# Patient Record
Sex: FEMALE | Race: BLACK OR AFRICAN AMERICAN | Employment: FULL TIME | URBAN - METROPOLITAN AREA
[De-identification: names, ages, dates, MRNs, and addresses within clinical notes are randomized per-mention and may not be internally consistent; named-entity substitution may affect disease eponyms.]

---

## 2024-02-20 SDOH — HEALTH STABILITY: PHYSICAL HEALTH: ON AVERAGE, HOW MANY DAYS PER WEEK DO YOU ENGAGE IN MODERATE TO STRENUOUS EXERCISE (LIKE A BRISK WALK)?: 1 DAY

## 2024-02-21 ENCOUNTER — TELEPHONE (OUTPATIENT)
Dept: PRIMARY CARE CLINIC | Age: 29
End: 2024-02-21

## 2024-02-21 ENCOUNTER — OFFICE VISIT (OUTPATIENT)
Dept: PRIMARY CARE CLINIC | Age: 29
End: 2024-02-21
Payer: COMMERCIAL

## 2024-02-21 VITALS
WEIGHT: 170.2 LBS | SYSTOLIC BLOOD PRESSURE: 108 MMHG | BODY MASS INDEX: 25.79 KG/M2 | DIASTOLIC BLOOD PRESSURE: 68 MMHG | RESPIRATION RATE: 16 BRPM | OXYGEN SATURATION: 99 % | HEIGHT: 68 IN | HEART RATE: 75 BPM | TEMPERATURE: 97.9 F

## 2024-02-21 DIAGNOSIS — Z11.59 ENCOUNTER FOR HEPATITIS C SCREENING TEST FOR LOW RISK PATIENT: ICD-10-CM

## 2024-02-21 DIAGNOSIS — Z11.4 ENCOUNTER FOR SCREENING FOR HIV: ICD-10-CM

## 2024-02-21 DIAGNOSIS — R25.1 TREMOR OF BOTH HANDS: ICD-10-CM

## 2024-02-21 DIAGNOSIS — Z13.1 DIABETES MELLITUS SCREENING: ICD-10-CM

## 2024-02-21 DIAGNOSIS — Z00.00 HEALTHCARE MAINTENANCE: Primary | ICD-10-CM

## 2024-02-21 PROCEDURE — G8484 FLU IMMUNIZE NO ADMIN: HCPCS | Performed by: FAMILY MEDICINE

## 2024-02-21 PROCEDURE — 99385 PREV VISIT NEW AGE 18-39: CPT | Performed by: FAMILY MEDICINE

## 2024-02-21 SDOH — ECONOMIC STABILITY: HOUSING INSECURITY
IN THE LAST 12 MONTHS, WAS THERE A TIME WHEN YOU DID NOT HAVE A STEADY PLACE TO SLEEP OR SLEPT IN A SHELTER (INCLUDING NOW)?: NO

## 2024-02-21 SDOH — ECONOMIC STABILITY: FOOD INSECURITY: WITHIN THE PAST 12 MONTHS, THE FOOD YOU BOUGHT JUST DIDN'T LAST AND YOU DIDN'T HAVE MONEY TO GET MORE.: NEVER TRUE

## 2024-02-21 SDOH — ECONOMIC STABILITY: FOOD INSECURITY: WITHIN THE PAST 12 MONTHS, YOU WORRIED THAT YOUR FOOD WOULD RUN OUT BEFORE YOU GOT MONEY TO BUY MORE.: NEVER TRUE

## 2024-02-21 SDOH — ECONOMIC STABILITY: INCOME INSECURITY: HOW HARD IS IT FOR YOU TO PAY FOR THE VERY BASICS LIKE FOOD, HOUSING, MEDICAL CARE, AND HEATING?: NOT HARD AT ALL

## 2024-02-21 ASSESSMENT — PATIENT HEALTH QUESTIONNAIRE - PHQ9
2. FEELING DOWN, DEPRESSED OR HOPELESS: 0
1. LITTLE INTEREST OR PLEASURE IN DOING THINGS: 0
SUM OF ALL RESPONSES TO PHQ QUESTIONS 1-9: 0
SUM OF ALL RESPONSES TO PHQ9 QUESTIONS 1 & 2: 0

## 2024-02-21 ASSESSMENT — ENCOUNTER SYMPTOMS
SHORTNESS OF BREATH: 0
COUGH: 0
COLOR CHANGE: 0
ABDOMINAL PAIN: 0
VOMITING: 0
RHINORRHEA: 0
NAUSEA: 0
BLOOD IN STOOL: 0
DIARRHEA: 0

## 2024-02-21 NOTE — ASSESSMENT & PLAN NOTE
Well controlled and intermittent. Unclear etiology. Will check CBC and TSH for this. Normal neuro exam in office. Brother with similar sx, likely benign and possibly familial. Call back/ED precautions discussed.

## 2024-02-21 NOTE — ASSESSMENT & PLAN NOTE
Overall doing well. Age appropriate screenings provided as per orders below. Reminded to schedule eye exam when able. Other problems addressed today as otherwise noted.

## 2024-02-21 NOTE — PROGRESS NOTES
Robyn Covarrubias is a 28 y.o. year old female here for:    Chief Complaint:    Chief Complaint   Patient presents with    Annual Exam     Subjective:    Today, her current concerns include:    #Tremors  - Onset: 1 year ago  - Context: Does have some anxiety and over thinks, unsure if linked to this or caffeine use (happens with no caffeine use too occasionally). No known hx of COVID. No neuro issues in family known. Brother with similar sx.  - Location:  Hands only  - Quality: When she tries to do something - intention related  - Radiation: None  - Severity: No pain  - Timing/Duration:  - Inciting Event: Denied falls, head trauma, LOC or hx of concussions  - Progression: Stable  - Modifiers: Nothing known makes it better or worse  - Associated Symptoms: Per ROS as otherwise stated in this note. Denied HA or changes in vision.  - Previous occurrence: Never before    Preventive Services:    Health Maintenance History:  Patient exercises regularly? Nothing regimented - staying active  Diet? Tries to eat a healthy, lots of water, some water  Dental: Last visit was last week  Glasses/Eyes: Last visit was more than 1 year ago    Other Health Maintenance History:  Patient has previous history of abnormal breast mass?: no  Patient has previous history of abnormal pap smear?:  no  Patient is on Hormone Replacement therapy or Birth Control? no  Sexually active? Not currently  In the past two weeks have they been bothered by feeling \"down\", depressed or hopeless?  no  In the past two weeks, have they experienced a loss of interest or pleasure in doing things?  no  In the last year, have you fallen more than once or been seriously injured in a fall?  no  Advance Directives: Not in place. Provided instructions today on how to sign up/provide these on [facility].    Health Maintenance Screening:  Diabetes screening: was provided today  Cholesterol screening: was not applicable to this patient based on their risk factors and evidence

## 2024-02-21 NOTE — PATIENT INSTRUCTIONS
For help and support with [employer] donaldo/portal set-up, please call [phone].     Reminder: Please call to schedule eye exam when able.    Ginaa soda.    Water: 64 ounces per day    Lifestyle modifications discussed today:    Exercise:    In accordance with AHA/ACC guidelines:    - Get at least 150 minutes per week of moderate-intensity aerobic activity OR 75 minutes per week of vigorous aerobic activity, OR a combination of both, preferably spread throughout the week.    - Add moderate- to high-intensity muscle-strengthening activity (such as resistance or weights) on at least 2 days per week.    - Gain even more benefits by being active at least 300 minutes (5 hours) per week.    Increase amount and intensity gradually over time.    Please find our [facility] Lab Location Guide below for your convenience!    CENTRAL LOCATIONS    1) [facility]  [address]  [place], [place] [address]  Phone: [phone]    2) [facility]  [address] [place], [place] [address]  Phone: [phone]    [facility] LOCATIONS    3) [facility]  [address]  [place], [place] [address]  Phone: [phone]    4) [facility]  [address] [place] [address]  Phone: [phone]    5) [facility]  [address] [place] [address]  Phone: [phone]    [place] LOCATIONS    6) [facility]  [address]  [place] [address]  Phone: [phone]    7) [facility]  [address] [place] [address]  Phone: [phone]    8) [facility]  [address] [place] [address]  Phone: [phone]    9) [facility]  [address]  [place] [address]  Phone: [phone]    [place] LOCATIONS    10) [facility]  [address]  [place] [address]  Phone: [phone]    11) [facility]

## 2024-03-22 PROBLEM — Z00.00 HEALTHCARE MAINTENANCE: Status: RESOLVED | Noted: 2024-02-21 | Resolved: 2024-03-22

## 2024-03-22 PROBLEM — Z11.59 ENCOUNTER FOR HEPATITIS C SCREENING TEST FOR LOW RISK PATIENT: Status: RESOLVED | Noted: 2024-02-21 | Resolved: 2024-03-22

## 2024-03-22 PROBLEM — Z11.4 ENCOUNTER FOR SCREENING FOR HIV: Status: RESOLVED | Noted: 2024-02-21 | Resolved: 2024-03-22

## 2024-03-22 PROBLEM — Z13.1 DIABETES MELLITUS SCREENING: Status: RESOLVED | Noted: 2024-02-21 | Resolved: 2024-03-22

## 2024-10-29 ENCOUNTER — OFFICE VISIT (OUTPATIENT)
Dept: PRIMARY CARE CLINIC | Age: 29
End: 2024-10-29
Payer: COMMERCIAL

## 2024-10-29 VITALS
DIASTOLIC BLOOD PRESSURE: 82 MMHG | TEMPERATURE: 98.9 F | SYSTOLIC BLOOD PRESSURE: 102 MMHG | HEIGHT: 69 IN | WEIGHT: 163 LBS | BODY MASS INDEX: 24.14 KG/M2 | OXYGEN SATURATION: 99 % | RESPIRATION RATE: 16 BRPM | HEART RATE: 76 BPM

## 2024-10-29 DIAGNOSIS — R42 DIZZINESS: Primary | ICD-10-CM

## 2024-10-29 PROCEDURE — G8484 FLU IMMUNIZE NO ADMIN: HCPCS | Performed by: FAMILY MEDICINE

## 2024-10-29 PROCEDURE — 99213 OFFICE O/P EST LOW 20 MIN: CPT | Performed by: FAMILY MEDICINE

## 2024-10-29 PROCEDURE — G8427 DOCREV CUR MEDS BY ELIG CLIN: HCPCS | Performed by: FAMILY MEDICINE

## 2024-10-29 PROCEDURE — 1036F TOBACCO NON-USER: CPT | Performed by: FAMILY MEDICINE

## 2024-10-29 PROCEDURE — G8420 CALC BMI NORM PARAMETERS: HCPCS | Performed by: FAMILY MEDICINE

## 2024-10-29 ASSESSMENT — ENCOUNTER SYMPTOMS
ABDOMINAL PAIN: 0
NAUSEA: 0
DIARRHEA: 0
SHORTNESS OF BREATH: 0
COLOR CHANGE: 0
RHINORRHEA: 0
BLOOD IN STOOL: 0
VOMITING: 0
COUGH: 0

## 2024-10-29 NOTE — PROGRESS NOTES
Robyn Covarrubias is a 28 y.o. year old female here for:    Chief Complaint:    Chief Complaint   Patient presents with    Sinus Problem     Subjective:    Today, her current concerns include:    HPI:  #Sinus Pressure  - Onset: 1 month ago  - Context: Pressure behind eyes, lasting seconds and then it went away. Last eye was over 1 year ago. Denied known sick contacts or recent travel. No known hx of COVID.  - Location: Maxillary - bilaterally  - Quality: Pressure  - Severity: No pain  - Timing/Duration: Intermittent and last seconds when present  - Inciting Event: Denied LOC, head injury or trauma or hx of concussion.  - Progression: Stable  - Modifiers: Nothing known makes it better or worse  - Associated Symptoms: Per ROS as otherwise stated in this note: dizziness, denied pain or sx waking from sleep  - Previous occurrence: Never before    Past Medical History:   Diagnosis Date    Anxiety     Hx of mild exercise-induced asthma     In high school    Tear of lateral cartilage or meniscus of knee, current 06/23/2011     Social History     Tobacco Use    Smoking status: Never    Smokeless tobacco: Never    Tobacco comments:     Social smoking here and there in the past - not since 2021   Substance Use Topics    Alcohol use: Yes     Comment: 5 drinks per week - wine    Drug use: No     Family History   Problem Relation Age of Onset    Asthma Other     Diabetes Other     High Blood Pressure Other     Seizures Other     Breast Cancer Neg Hx     Colon Cancer Neg Hx      Past Surgical History:   Procedure Laterality Date    KNEE ARTHROSCOPY Left     ACL, MCL, meniscus       No current outpatient medications on file.  Allergies   Allergen Reactions    Percocet [Oxycodone-Acetaminophen] Itching       Review of Systems:  Review of Systems   Constitutional:  Negative for chills, diaphoresis and fever.   HENT:  Negative for congestion, hearing loss, rhinorrhea and tinnitus.    Eyes:  Negative for visual disturbance.

## 2024-10-29 NOTE — PATIENT INSTRUCTIONS
Reminder: Please call to schedule eye exam when able.    As we discussed, you can try:    - Zyrtec once daily - please be sure nothing else is in this just the active ingredient: Cetirizine  - Mucinex - twice daily - please be sure nothing else is in this just the active ingredient: Guaifenesin  - Use a humidifier when your heater is in use at home - even when not sick  - Flonase Sensimist - two sprays each nostril, once daily - rinse mouth out after  - Hydration - 64 ounces of water daily  - Honey (1 tsp per day - do not mix with lemon or water or anything).

## 2024-10-30 NOTE — ASSESSMENT & PLAN NOTE
Poorly controlled, but with normal neuro exam, no alarm signs or symptoms by HPI and PE and clear effusion in right ear. Possibly 2/2 congestion, overdue eye exam, eustachian tube dysfunction or sinus congestion. Reminded to schedule eye exam when able. She asked if she needed head imaging. Given she is worried due to new sx, never experienced before, referral placed to ENT to eval - defer imaging choice to their eval if useful there. Congestion suggestions provided today as well for her to try in the interim. She was amenable to this plan. Call back/ED precautions discussed.

## 2024-10-31 ENCOUNTER — TELEPHONE (OUTPATIENT)
Dept: PRIMARY CARE CLINIC | Age: 29
End: 2024-10-31

## 2024-10-31 NOTE — TELEPHONE ENCOUNTER
----- Message from Dr. Jessica Carballo MD sent at 10/31/2024 10:14 AM EDT -----  Help with this please. Thank you!  ----- Message -----  From: Jessica Carballo MD  Sent: 10/31/2024   7:00 AM EDT  To: Jessica Carballo MD    Check on dizziness and ensure she scheduled with ENT - please and thank you!

## 2024-11-06 ENCOUNTER — TELEPHONE (OUTPATIENT)
Dept: PRIMARY CARE CLINIC | Age: 29
End: 2024-11-06

## 2024-11-06 NOTE — TELEPHONE ENCOUNTER
----- Message from Dr. Jessica Carballo MD sent at 11/5/2024 11:46 PM EST -----  Can we try one last time to check in on her please, thank you!  ----- Message -----  From: Jessica Carballo MD  Sent: 10/31/2024   7:00 AM EST  To: Jessica Carballo MD    Check on dizziness and ensure she scheduled with ENT - please and thank you!

## 2025-02-28 ENCOUNTER — OFFICE VISIT (OUTPATIENT)
Dept: PRIMARY CARE CLINIC | Age: 30
End: 2025-02-28
Payer: COMMERCIAL

## 2025-02-28 VITALS
HEIGHT: 67 IN | BODY MASS INDEX: 25.58 KG/M2 | SYSTOLIC BLOOD PRESSURE: 116 MMHG | HEART RATE: 73 BPM | WEIGHT: 163 LBS | OXYGEN SATURATION: 98 % | TEMPERATURE: 98.2 F | RESPIRATION RATE: 17 BRPM | DIASTOLIC BLOOD PRESSURE: 68 MMHG

## 2025-02-28 DIAGNOSIS — M54.41 CHRONIC LOW BACK PAIN WITH BILATERAL SCIATICA, UNSPECIFIED BACK PAIN LATERALITY: Primary | ICD-10-CM

## 2025-02-28 DIAGNOSIS — G89.29 CHRONIC LOW BACK PAIN WITH BILATERAL SCIATICA, UNSPECIFIED BACK PAIN LATERALITY: Primary | ICD-10-CM

## 2025-02-28 DIAGNOSIS — R21 RASH: ICD-10-CM

## 2025-02-28 DIAGNOSIS — M54.42 CHRONIC LOW BACK PAIN WITH BILATERAL SCIATICA, UNSPECIFIED BACK PAIN LATERALITY: Primary | ICD-10-CM

## 2025-02-28 PROBLEM — R42 DIZZINESS: Status: RESOLVED | Noted: 2024-10-29 | Resolved: 2025-02-28

## 2025-02-28 PROCEDURE — G8427 DOCREV CUR MEDS BY ELIG CLIN: HCPCS | Performed by: FAMILY MEDICINE

## 2025-02-28 PROCEDURE — 99213 OFFICE O/P EST LOW 20 MIN: CPT | Performed by: FAMILY MEDICINE

## 2025-02-28 PROCEDURE — 1036F TOBACCO NON-USER: CPT | Performed by: FAMILY MEDICINE

## 2025-02-28 PROCEDURE — G8419 CALC BMI OUT NRM PARAM NOF/U: HCPCS | Performed by: FAMILY MEDICINE

## 2025-02-28 RX ORDER — SELENIUM SULFIDE 22.5 MG/ML
SHAMPOO TOPICAL
Qty: 180 ML | Refills: 3 | Status: SHIPPED | OUTPATIENT
Start: 2025-02-28

## 2025-02-28 SDOH — ECONOMIC STABILITY: FOOD INSECURITY: WITHIN THE PAST 12 MONTHS, YOU WORRIED THAT YOUR FOOD WOULD RUN OUT BEFORE YOU GOT MONEY TO BUY MORE.: NEVER TRUE

## 2025-02-28 SDOH — ECONOMIC STABILITY: FOOD INSECURITY: WITHIN THE PAST 12 MONTHS, THE FOOD YOU BOUGHT JUST DIDN'T LAST AND YOU DIDN'T HAVE MONEY TO GET MORE.: NEVER TRUE

## 2025-02-28 ASSESSMENT — PATIENT HEALTH QUESTIONNAIRE - PHQ9
SUM OF ALL RESPONSES TO PHQ QUESTIONS 1-9: 0
SUM OF ALL RESPONSES TO PHQ9 QUESTIONS 1 & 2: 0
SUM OF ALL RESPONSES TO PHQ QUESTIONS 1-9: 0
1. LITTLE INTEREST OR PLEASURE IN DOING THINGS: NOT AT ALL
SUM OF ALL RESPONSES TO PHQ QUESTIONS 1-9: 0
SUM OF ALL RESPONSES TO PHQ QUESTIONS 1-9: 0
2. FEELING DOWN, DEPRESSED OR HOPELESS: NOT AT ALL

## 2025-02-28 ASSESSMENT — ENCOUNTER SYMPTOMS
COUGH: 0
BLOOD IN STOOL: 0
RHINORRHEA: 0
ABDOMINAL PAIN: 0
VOMITING: 0
COLOR CHANGE: 0
BACK PAIN: 1
SHORTNESS OF BREATH: 0
NAUSEA: 0
DIARRHEA: 0

## 2025-02-28 NOTE — PROGRESS NOTES
Robyn Covarrubias is a 29 y.o. year old female here for:    Chief Complaint:    Chief Complaint   Patient presents with    Back Pain    Rash     Subjective:    Today, her current concerns include:    HPI:  #Back Pain  - Onset: 1.5 weeks ago  - Context: See below. Normal BM - last was yesterday  - Location: Lower - center  - Quality: Sharp and tight. More sharp.  - Radiation: None  - Severity: At worst pain is 8.5/10, currently 3/10  - Timing/Duration: Constant since onset, varies in severity  - Inciting Event: She was at a basketball game, went to stand up and then since then noticed that back was hurting  - Progression: Stable  - Modifiers: Worse when she sits, tries to stand or when she drives  - Associated Symptoms: Per ROS as otherwise stated in this note. Denied retention of bladder or incontinence of bowel. Pain does not wake from sleep.  - Previous occurrence: Never before    #Rash  - A year now  - Upper back only, nowhere else on body  - Occasionally itchy  - Worsening    Past Medical History:   Diagnosis Date    Anxiety     Dizziness 10/29/2024    Hx of mild exercise-induced asthma     In high school    Tear of lateral cartilage or meniscus of knee, current 06/23/2011     Social History     Tobacco Use    Smoking status: Never    Smokeless tobacco: Never    Tobacco comments:     Social smoking here and there in the past - not since 2021   Substance Use Topics    Alcohol use: Yes     Comment: 5 drinks per week - wine    Drug use: No     Family History   Problem Relation Age of Onset    Breast Cancer Paternal Aunt         Dx at age 50s    Asthma Other     Diabetes Other     High Blood Pressure Other     Seizures Other     Colon Cancer Neg Hx      Past Surgical History:   Procedure Laterality Date    KNEE ARTHROSCOPY Left     ACL, MCL, meniscus         Current Outpatient Medications:     Selenium Sulfide 2.25 % SHAM, Apply on skin for 10 minutes, then rinse thoroughly; apply once daily for 7 days. Once control is

## 2025-02-28 NOTE — PATIENT INSTRUCTIONS
For Skin:    - Please use Dove unscented soap  - Please use Eucerin Original Healing Lotion three times daily

## 2025-03-01 NOTE — ASSESSMENT & PLAN NOTE
Poorly controlled and likely sprain/strain. + straight leg raise test bilaterally, L>R. No alarm symptoms on exam or HPI. Will treat conservatively: advised can use NSAID with food/no ETOH. Also encouraged heat/ice and provided back stretches. If not improved can consider PT. Return/ED precautions reviewed today as well. She was amenable to this plan.

## 2025-03-01 NOTE — ASSESSMENT & PLAN NOTE
Poorly controlled. Appears to be tinea versicolor. Will treat with topical shampoo as noted in orders below. If no change at annual (close f/u in 2 weeks) can consider Derm referral. Eczema is also on DDx. She uses Dove soap at baseline.

## 2025-04-14 ENCOUNTER — OFFICE VISIT (OUTPATIENT)
Dept: PRIMARY CARE CLINIC | Age: 30
End: 2025-04-14
Payer: COMMERCIAL

## 2025-04-14 VITALS
HEART RATE: 67 BPM | TEMPERATURE: 99 F | DIASTOLIC BLOOD PRESSURE: 70 MMHG | BODY MASS INDEX: 26.84 KG/M2 | OXYGEN SATURATION: 98 % | WEIGHT: 171 LBS | HEIGHT: 67 IN | RESPIRATION RATE: 17 BRPM | SYSTOLIC BLOOD PRESSURE: 116 MMHG

## 2025-04-14 DIAGNOSIS — Z00.00 HEALTHCARE MAINTENANCE: Primary | ICD-10-CM

## 2025-04-14 DIAGNOSIS — Z13.1 DIABETES MELLITUS SCREENING: ICD-10-CM

## 2025-04-14 DIAGNOSIS — Z11.59 ENCOUNTER FOR HEPATITIS C SCREENING TEST FOR LOW RISK PATIENT: ICD-10-CM

## 2025-04-14 DIAGNOSIS — Z11.4 ENCOUNTER FOR SCREENING FOR HIV: ICD-10-CM

## 2025-04-14 PROCEDURE — 99395 PREV VISIT EST AGE 18-39: CPT | Performed by: FAMILY MEDICINE

## 2025-04-14 SDOH — ECONOMIC STABILITY: FOOD INSECURITY: WITHIN THE PAST 12 MONTHS, THE FOOD YOU BOUGHT JUST DIDN'T LAST AND YOU DIDN'T HAVE MONEY TO GET MORE.: NEVER TRUE

## 2025-04-14 SDOH — ECONOMIC STABILITY: FOOD INSECURITY: WITHIN THE PAST 12 MONTHS, YOU WORRIED THAT YOUR FOOD WOULD RUN OUT BEFORE YOU GOT MONEY TO BUY MORE.: NEVER TRUE

## 2025-04-14 ASSESSMENT — ENCOUNTER SYMPTOMS
COLOR CHANGE: 0
BLOOD IN STOOL: 0
NAUSEA: 0
COUGH: 0
VOMITING: 0
RHINORRHEA: 0
DIARRHEA: 0
ABDOMINAL PAIN: 0
SHORTNESS OF BREATH: 0

## 2025-04-14 ASSESSMENT — PATIENT HEALTH QUESTIONNAIRE - PHQ9
1. LITTLE INTEREST OR PLEASURE IN DOING THINGS: NOT AT ALL
2. FEELING DOWN, DEPRESSED OR HOPELESS: NOT AT ALL
SUM OF ALL RESPONSES TO PHQ QUESTIONS 1-9: 0

## 2025-04-14 NOTE — PROGRESS NOTES
Robyn Covarrubias is a 29 y.o. year old female here for:    Chief Complaint:    Chief Complaint   Patient presents with    Annual Exam     Subjective:    Today, her current concerns include:    Preventive Services:    Health Maintenance History:  Patient exercises regularly? Staying active, nothing regimented  Diet? Tries to eat a healthy diet, no soda, lots of water  Dental: Last visit was 2 weeks ago  Glasses/Eyes: Last visit was a couple of years    Other Health Maintenance History:  Patient has previous history of abnormal breast mass?: no  Patient has previous history of abnormal pap smear?:  no  Patient is on Hormone Replacement therapy or Birth Control? no  Sexually active? Not currently  In the past two weeks have they been bothered by feeling \"down\", depressed or hopeless?  no  In the past two weeks, have they experienced a loss of interest or pleasure in doing things?  no  In the last year, have you fallen more than once or been seriously injured in a fall?  no  Advance Directives: Not in place. Provided instructions today on how to sign up/provide these on [email or web address].    Health Maintenance Screening:  Diabetes screening: was provided today  Cholesterol screening: was not applicable to this patient based on their risk factors and evidence based recommendations today  Pelvic exam and pap smear: was not performed today - UTD on this  Screening mammogram order slip: was not applicable to this patient based on their risk factors and evidence based recommendations (no personal or close family history of breast CA) today  Bone Density test order: was not applicable to this patient based on their risk factors and evidence based recommendations today  Colorectal cancer screening (Screening colonoscopy) order: was not applicable to this patient based on their risk factors and evidence based recommendations (no personal or family history of colon CA) today  Lung Cancer Screen:was not applicable to this patient based on

## 2025-04-14 NOTE — ASSESSMENT & PLAN NOTE
Overall doing well. Age appropriate screenings provided as per orders below. Reminded to schedule eye exam when able. Counseled briefly and provided written materials on recommendations for exercise.

## 2025-04-14 NOTE — PATIENT INSTRUCTIONS
Reminder: Please call to schedule eye exam when able.    Lifestyle modifications discussed today:    Exercise:    In accordance with AHA/ACC guidelines:    - Get at least 150 minutes per week of moderate-intensity aerobic activity OR 75 minutes per week of vigorous aerobic activity, OR a combination of both, preferably spread throughout the week.    - Add moderate- to high-intensity muscle-strengthening activity (such as resistance or weights) on at least 2 days per week.    - Gain even more benefits by being active at least 300 minutes (5 hours) per week.    Increase amount and intensity gradually over time.    Please find our [facility] Lab Location Guide below for your convenience!    CENTRAL LOCATIONS    1) [facility]  [address]  [place], [place] [address]  Phone: [phone]    2) [facility]  [address], [place] [address]  Phone: [phone]    [facility] LOCATIONS    3) [facility]  [address]  [place], [place] [address]  Phone: [phone]    4) [facility]  [address] [place] [address]  Phone: [phone]    5) [facility]  [address] [place] [address]  Phone: [phone]    6) [facility]  [address]  [place] [address]   Phone: [phone]    [facility]    7) [facility]  [address]  [place] [address]  Phone: [phone]    8) [facility]  [address] [place] [address]  Phone: [phone]    9) [facility]  [address] [place] [address]  Phone: [phone]    10) [facility]  [address]  [place] [address]  Phone: [phone]    [place] LOCATIONS    11) [facility]  [address]  [place] [address]  Phone: [phone]    12) [facility]  [address]

## 2025-05-14 PROBLEM — Z11.59 ENCOUNTER FOR HEPATITIS C SCREENING TEST FOR LOW RISK PATIENT: Status: RESOLVED | Noted: 2024-02-21 | Resolved: 2025-05-14

## 2025-05-14 PROBLEM — Z11.4 ENCOUNTER FOR SCREENING FOR HIV: Status: RESOLVED | Noted: 2024-02-21 | Resolved: 2025-05-14

## 2025-05-14 PROBLEM — Z13.1 DIABETES MELLITUS SCREENING: Status: RESOLVED | Noted: 2024-02-21 | Resolved: 2025-05-14

## 2025-05-14 PROBLEM — Z00.00 HEALTHCARE MAINTENANCE: Status: RESOLVED | Noted: 2024-02-21 | Resolved: 2025-05-14

## 2025-09-05 ENCOUNTER — OFFICE VISIT (OUTPATIENT)
Dept: PRIMARY CARE CLINIC | Age: 30
End: 2025-09-05
Payer: COMMERCIAL

## 2025-09-05 VITALS
WEIGHT: 178.2 LBS | DIASTOLIC BLOOD PRESSURE: 74 MMHG | HEART RATE: 73 BPM | TEMPERATURE: 98.9 F | SYSTOLIC BLOOD PRESSURE: 116 MMHG | HEIGHT: 67 IN | BODY MASS INDEX: 27.97 KG/M2 | OXYGEN SATURATION: 99 %

## 2025-09-05 DIAGNOSIS — L70.0 ACNE VULGARIS: Primary | ICD-10-CM

## 2025-09-05 PROCEDURE — 1036F TOBACCO NON-USER: CPT | Performed by: FAMILY MEDICINE

## 2025-09-05 PROCEDURE — G8419 CALC BMI OUT NRM PARAM NOF/U: HCPCS | Performed by: FAMILY MEDICINE

## 2025-09-05 PROCEDURE — 99214 OFFICE O/P EST MOD 30 MIN: CPT | Performed by: FAMILY MEDICINE

## 2025-09-05 PROCEDURE — G8427 DOCREV CUR MEDS BY ELIG CLIN: HCPCS | Performed by: FAMILY MEDICINE

## 2025-09-05 RX ORDER — DOXYCYCLINE HYCLATE 100 MG
100 TABLET ORAL DAILY
Qty: 90 TABLET | Refills: 0 | Status: SHIPPED | OUTPATIENT
Start: 2025-09-05

## 2025-09-05 RX ORDER — ERYTHROMYCIN AND BENZOYL PEROXIDE 30; 50 MG/G; MG/G
GEL TOPICAL
Qty: 46.6 G | Refills: 3 | Status: SHIPPED | OUTPATIENT
Start: 2025-09-05 | End: 2025-10-05

## 2025-09-05 ASSESSMENT — ENCOUNTER SYMPTOMS
SHORTNESS OF BREATH: 0
VOMITING: 0
BLOOD IN STOOL: 0
COLOR CHANGE: 0
NAUSEA: 0
ABDOMINAL PAIN: 0
RHINORRHEA: 0
DIARRHEA: 0
COUGH: 0